# Patient Record
Sex: FEMALE | Race: WHITE | NOT HISPANIC OR LATINO | Employment: FULL TIME | URBAN - METROPOLITAN AREA
[De-identification: names, ages, dates, MRNs, and addresses within clinical notes are randomized per-mention and may not be internally consistent; named-entity substitution may affect disease eponyms.]

---

## 2018-11-26 ENCOUNTER — OFFICE VISIT (OUTPATIENT)
Dept: OBSTETRICS AND GYNECOLOGY | Facility: CLINIC | Age: 43
End: 2018-11-26

## 2018-11-26 VITALS
HEIGHT: 63 IN | SYSTOLIC BLOOD PRESSURE: 122 MMHG | WEIGHT: 293 LBS | DIASTOLIC BLOOD PRESSURE: 80 MMHG | BODY MASS INDEX: 51.91 KG/M2

## 2018-11-26 DIAGNOSIS — N93.8 DYSFUNCTIONAL UTERINE BLEEDING: ICD-10-CM

## 2018-11-26 DIAGNOSIS — Z72.0 TOBACCO USE: ICD-10-CM

## 2018-11-26 DIAGNOSIS — Z01.419 VISIT FOR GYNECOLOGIC EXAMINATION: Primary | ICD-10-CM

## 2018-11-26 PROCEDURE — 99386 PREV VISIT NEW AGE 40-64: CPT | Performed by: OBSTETRICS & GYNECOLOGY

## 2018-11-26 RX ORDER — INFLUENZA VIRUS VACCINE 15; 15; 15; 15 UG/.5ML; UG/.5ML; UG/.5ML; UG/.5ML
SUSPENSION INTRAMUSCULAR
COMMUNITY
Start: 2018-10-16

## 2018-11-26 NOTE — PATIENT INSTRUCTIONS
IF YOU SMOKE OR USE TOBACCO PLEASE READ THE FOLLOWING:    Why is smoking bad for me?  Smoking increases the risk of heart disease, lung disease, vascular disease, stroke, and cancer.     If you smoke, STOP!    If you would like more information on quitting smoking, please visit the [employer] website: www.BigEvidence/PostPath/healthier-together/smoke   This link will provide additional resources including the QUIT line and the Beat the Pack support groups.     For more information:    Quit Now [facility]  1-800-QUIT-NOW  https://[place].quitlogix.org/en-[place]/

## 2018-11-27 LAB
ALBUMIN SERPL-MCNC: 3.9 G/DL (ref 3.5–5.2)
ALBUMIN/GLOB SERPL: 1.1 G/DL
ALP SERPL-CCNC: 86 U/L (ref 39–117)
ALT SERPL-CCNC: 95 U/L (ref 1–33)
AST SERPL-CCNC: 68 U/L (ref 1–32)
BILIRUB SERPL-MCNC: 0.2 MG/DL (ref 0.1–1.2)
BUN SERPL-MCNC: 7 MG/DL (ref 6–20)
BUN/CREAT SERPL: 11.3 (ref 7–25)
CALCIUM SERPL-MCNC: 9.3 MG/DL (ref 8.6–10.5)
CHLORIDE SERPL-SCNC: 100 MMOL/L (ref 98–107)
CO2 SERPL-SCNC: 24.2 MMOL/L (ref 22–29)
CREAT SERPL-MCNC: 0.62 MG/DL (ref 0.57–1)
ERYTHROCYTE [DISTWIDTH] IN BLOOD BY AUTOMATED COUNT: 14.9 % (ref 11.7–13)
FSH SERPL-ACNC: 2.3 MIU/ML
GLOBULIN SER CALC-MCNC: 3.5 GM/DL
GLUCOSE SERPL-MCNC: 130 MG/DL (ref 65–99)
HCT VFR BLD AUTO: 43.7 % (ref 35.6–45.5)
HGB BLD-MCNC: 14.2 G/DL (ref 11.9–15.5)
MCH RBC QN AUTO: 33.3 PG (ref 26.9–32)
MCHC RBC AUTO-ENTMCNC: 32.5 G/DL (ref 32.4–36.3)
MCV RBC AUTO: 102.3 FL (ref 80.5–98.2)
PLATELET # BLD AUTO: 218 10*3/MM3 (ref 140–500)
POTASSIUM SERPL-SCNC: 4.2 MMOL/L (ref 3.5–5.2)
PROLACTIN SERPL-MCNC: 14.2 NG/ML (ref 4.8–23.3)
PROT SERPL-MCNC: 7.4 G/DL (ref 6–8.5)
RBC # BLD AUTO: 4.27 10*6/MM3 (ref 3.9–5.2)
SODIUM SERPL-SCNC: 139 MMOL/L (ref 136–145)
TSH SERPL DL<=0.005 MIU/L-ACNC: 3.38 MIU/ML (ref 0.27–4.2)
WBC # BLD AUTO: 9.24 10*3/MM3 (ref 4.5–10.7)

## 2018-11-27 NOTE — PROGRESS NOTES
"[facility]  [address], [address]  [place], [place] [address]  Phone: [phone] / Fax:  [phone]      11/26/2018    [address] m [facility] [address]    Provider, No Known    Chief Complaint   Patient presents with   • Gynecologic Exam     Np Annual. Pt c/o recent irregular bleeding with passing of clots.       Taniya Fong is here for annual gynecologic exam.  HPI - Patient has not had a gynecologic exam or pap test in over 5 years.  Her cycles are generally \"heavier\" in that she uses 6 to 8 pads on her heaviest day.  However, she had a prolonged episode of bleeding over the past 7 to 10 days.  Patient passed large clots and required multiple hygiene pads/tampons on heaviest day.  Cramping is moderate.  She has not had a work up and this is the first time an bleeding episode like this has occurred.    Past Medical History:   Diagnosis Date   • Abnormal Pap smear of cervix    • HPV (human papilloma virus) infection    • Obese        Past Surgical History:   Procedure Laterality Date   • CERVICAL BIOPSY  W/ LOOP ELECTRODE EXCISION     • TUBAL ABDOMINAL LIGATION         Allergies   Allergen Reactions   • Codeine Delirium       Social History     Socioeconomic History   • Marital status:      Spouse name: Not on file   • Number of children: Not on file   • Years of education: Not on file   • Highest education level: Not on file   Social Needs   • Financial resource strain: Not on file   • Food insecurity - worry: Not on file   • Food insecurity - inability: Not on file   • Transportation needs - medical: Not on file   • Transportation needs - non-medical: Not on file   Occupational History   • Not on file   Tobacco Use   • Smoking status: Current Every Day Smoker   Substance and Sexual Activity   • Alcohol use: No     Frequency: Never   • Drug use: No   • Sexual activity: Yes     Birth control/protection: Surgical     Comment: vasectomy   Other Topics Concern   • Not on file   Social " "History Narrative   • Not on file       Family History   Problem Relation Age of Onset   • Hypertension Father    • Diabetes Father    • Heart block Father    • Bipolar disorder Mother    • Stroke Maternal Grandmother        Patient's last menstrual period was 10/28/2018 (exact date).    OB History      Para Term  AB Living    4              SAB TAB Ectopic Molar Multiple Live Births              4          Vitals:    18 0913   BP: 122/80   Weight: (!) 165 kg (363 lb)   Height: 160 cm (63\")       Physical Exam   Constitutional: She appears well-developed and well-nourished.   Genitourinary: Vagina normal. Pelvic exam was performed with patient supine. There is no tenderness or lesion on the right labia. There is no tenderness or lesion on the left labia. Cervix does not exhibit motion tenderness or lesion.   Genitourinary Comments: Exam compromised by obesity   HENT:   Right Ear: External ear normal.   Left Ear: External ear normal.   Nose: Nose normal.   Eyes: Conjunctivae are normal.   Neck: Normal range of motion. Neck supple. No thyromegaly present.   Cardiovascular: Normal rate, regular rhythm and normal heart sounds.   Pulmonary/Chest: Effort normal. No stridor. She has no wheezes. Right breast exhibits no mass, no nipple discharge and no skin change. Left breast exhibits no mass, no nipple discharge and no skin change.   Abdominal: Soft. There is no tenderness. There is no rebound and no guarding.   Musculoskeletal: Normal range of motion.   Neurological: She is alert. Coordination normal.   Skin: Skin is warm and dry.   Psychiatric: She has a normal mood and affect. Her behavior is normal. Judgment and thought content normal.   Vitals reviewed.      Taniya was seen today for gynecologic exam.    Diagnoses and all orders for this visit:    Visit for gynecologic examination  -     IgP, Aptima HPV  -     Patient with long history of abnormal pap; patient required treatment in past but only " had one pap after that.  -     Discussed importance of regular screening and breast awareness.    Dysfunctional uterine bleeding  -     CBC (No Diff)  -     TSH Rfx On Abnormal To Free T4  -     Follicle Stimulating Hormone  -     Comprehensive Metabolic Panel  -     Prolactin  -     Discussed work up.  Patient at increased risk for endometrial pathology and will likely need some form of sampling.  Patient to follow up after sonogram completed.    Tobacco use        -     Patient understands that she should not smoke.  She has set a quit date for January 1 2019 and plans to stick with this.  In the mean time, she is attempting to smoke less.  I spent 3 minutes discussing and counseling.      Shane Penny MD

## 2018-11-29 ENCOUNTER — TELEPHONE (OUTPATIENT)
Dept: OBSTETRICS AND GYNECOLOGY | Facility: CLINIC | Age: 43
End: 2018-11-29

## 2018-11-29 LAB
CYTOLOGIST CVX/VAG CYTO: NORMAL
CYTOLOGY CVX/VAG DOC THIN PREP: NORMAL
DX ICD CODE: NORMAL
HIV 1 & 2 AB SER-IMP: NORMAL
HPV I/H RISK 4 DNA CVX QL PROBE+SIG AMP: NEGATIVE
OTHER STN SPEC: NORMAL
PATH REPORT.FINAL DX SPEC: NORMAL
STAT OF ADQ CVX/VAG CYTO-IMP: NORMAL

## 2018-11-29 NOTE — TELEPHONE ENCOUNTER
Pt aware of results and states she will more than likely have to cancel her upcoming appointment due to loss of Insurance and will r/s if that happens. 11-29-18/lw  ----- Message from Shane Penny MD sent at 11/29/2018  4:00 PM EST -----  LAW - Please let her know that her pap and lab work basically returned normal.  She should keep her follow up with me.